# Patient Record
(demographics unavailable — no encounter records)

---

## 2025-01-15 NOTE — HEALTH RISK ASSESSMENT
[No] : In the past 12 months have you used drugs other than those required for medical reasons? No [No falls in past year] : Patient reported no falls in the past year [0] : 2) Feeling down, depressed, or hopeless: Not at all (0) [PHQ-2 Negative - No further assessment needed] : PHQ-2 Negative - No further assessment needed [Patient/Caregiver not ready to engage] : , patient/caregiver not ready to engage [Current] : Current [Audit-CScore] : 0 [[id number]] : 0 [AdvancecareDate] : 07/24 [de-identified] : 1/2 PPD, used to smoke 3 packs per day since last months

## 2025-01-15 NOTE — REVIEW OF SYSTEMS
[Fatigue] : fatigue [Postnasal Drip] : postnasal drip [Negative] : Heme/Lymph [Fever] : no fever [Night Sweats] : no night sweats [Earache] : no earache [Hearing Loss] : no hearing loss [FreeTextEntry4] : left facial droop

## 2025-01-15 NOTE — HISTORY OF PRESENT ILLNESS
[FreeTextEntry1] : Follow up of a 63y/o male w/ PMHx of AF, VAISHNAVI, HLD, OVD, anxiety HTN, pre-DM. Here today to talk about weight loss medications.  He is smoking tobacco 1/2 PPD, decreased from 3 PPD a month ago. Doing well on Ozempic needs to go to2mg would also like increase in welbutrin.   [de-identified] : Follow up of a 61y/o male w/ PMHx of AF, VAISHNAVI, HLD, OVD, anxiety HTN, pre-DM. Here today to talk about weight loss medications.  He is smoking tobacco 1/2 PPD, decreased from 3 PPD a month ago. Doing well on Ozempic needs to go to2mg would also like increase in welbutrin.

## 2025-01-15 NOTE — ASSESSMENT
[FreeTextEntry1] : Follow up of a 61y/o male w/ PMHx of AF, VAISHNAVI, HLD, OVD, anxiety HTN, pre-DM. Here today to talk about weight loss medications.  He is smoking tobacco 1/2 PPD, decreased from 3 PPD a month ago. Doing well on Ozempic needs to go to2mg would also like increase in welbutrin.  -care plan reviewed -labs drawn -medications reviewed -A1C today 5.4 -BP today 150/92  -Ozempic 2mg -RTC in 1 month

## 2025-01-15 NOTE — PLAN
[FreeTextEntry1] : Follow up of a 61y/o male w/ PMHx of AF, VAISHNAVI, HLD, OVD, anxiety HTN, pre-DM. Here today to talk about weight loss medications.  He is smoking tobacco 1/2 PPD, decreased from 3 PPD a month ago. Doing well on Ozempic needs to go to2mg would also like increase in welbutrin.  -care plan reviewed -labs drawn -medications reviewed -A1C today 5.4  -Ozempic 2mg -RTC in 1 month -RTC in 1 month

## 2025-07-09 NOTE — HEALTH RISK ASSESSMENT
[No] : In the past 12 months have you used drugs other than those required for medical reasons? No [No falls in past year] : Patient reported no falls in the past year [0] : 2) Feeling down, depressed, or hopeless: Not at all (0) [PHQ-2 Negative - No further assessment needed] : PHQ-2 Negative - No further assessment needed [Patient/Caregiver not ready to engage] : , patient/caregiver not ready to engage [Current] : Current [Audit-CScore] : 0 [GSC2Couft] : 0 [AdvancecareDate] : 07/24 [de-identified] : 1/2 PPD, used to smoke 3 packs per day since last months

## 2025-07-09 NOTE — ASSESSMENT
[FreeTextEntry1] : Follow up of a 63y/o male w/ PMHx of AF, VAISHNAVI, HLD, OVD, anxiety HTN, pre-DM. -care plan reviewed -labs drawn -medications reviewed -A1C today 5.3  -Ozempic 2mg -RTC in 1 month

## 2025-07-09 NOTE — PLAN
[FreeTextEntry1] : Follow up of a 63y/o male w/ PMHx of AF, VAISHNAVI, HLD, OVD, anxiety HTN, pre-DM for follow up. He reports no acute complaints. He denies CP, fevers, headaches, abdominal pain and SOB. He reports being recently hospitalized for a chainsaw accident to his right leg. He received stitches in the hospital. The stitches have been removed. He reports medication adherence. -care plan reviewed -labs drawn -medications reviewed -A1C today 5.3  -Ozempic 2mg -RTC in 1 month  -Ozempic 2mg -RTC in 1 month -RTC in 1 month

## 2025-07-09 NOTE — HISTORY OF PRESENT ILLNESS
[FreeTextEntry1] : Follow up of a 63y/o male w/ PMHx of AF, VAISHNAVI, HLD, OVD, anxiety HTN, pre-DM.   [de-identified] : Follow up of a 63y/o male w/ PMHx of AF, VAISHNAVI, HLD, OVD, anxiety HTN, pre-DM for follow up. He reports no acute complaints. He denies CP, fevers, headaches, abdominal pain and SOB. He reports being recently hospitalized for a chainsaw accident to his right leg. He received stitches in the hospital. The stitches have been removed. He reports medication adherence.